# Patient Record
Sex: FEMALE | Race: WHITE
[De-identification: names, ages, dates, MRNs, and addresses within clinical notes are randomized per-mention and may not be internally consistent; named-entity substitution may affect disease eponyms.]

---

## 2019-03-13 ENCOUNTER — HOSPITAL ENCOUNTER (EMERGENCY)
Dept: HOSPITAL 62 - ER | Age: 29
Discharge: HOME | End: 2019-03-13
Payer: COMMERCIAL

## 2019-03-13 VITALS — SYSTOLIC BLOOD PRESSURE: 143 MMHG | DIASTOLIC BLOOD PRESSURE: 91 MMHG

## 2019-03-13 DIAGNOSIS — F17.200: ICD-10-CM

## 2019-03-13 DIAGNOSIS — Z87.81: ICD-10-CM

## 2019-03-13 DIAGNOSIS — M25.552: ICD-10-CM

## 2019-03-13 DIAGNOSIS — M25.512: Primary | ICD-10-CM
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
## 2019-03-13 DIAGNOSIS — M25.562: ICD-10-CM

## 2019-03-13 DIAGNOSIS — R10.30: ICD-10-CM

## 2019-03-13 DIAGNOSIS — R51: ICD-10-CM

## 2019-03-13 PROCEDURE — 99283 EMERGENCY DEPT VISIT LOW MDM: CPT
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
## 2019-03-23 ENCOUNTER — HOSPITAL ENCOUNTER (EMERGENCY)
Dept: HOSPITAL 62 - ER | Age: 29
Discharge: HOME | End: 2019-03-23
Payer: MEDICAID

## 2019-03-23 VITALS — DIASTOLIC BLOOD PRESSURE: 74 MMHG | SYSTOLIC BLOOD PRESSURE: 138 MMHG
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
## 2019-03-23 DIAGNOSIS — S00.83XA: Primary | ICD-10-CM

## 2019-03-23 PROCEDURE — L0120 CERV FLEX N/ADJ FOAM PRE OTS: HCPCS

## 2019-03-23 PROCEDURE — 99283 EMERGENCY DEPT VISIT LOW MDM: CPT
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
## 2020-10-23 ENCOUNTER — HOSPITAL ENCOUNTER (EMERGENCY)
Dept: HOSPITAL 62 - ER | Age: 30
Discharge: ANOTHER HOSPITAL | End: 2020-10-23
Payer: COMMERCIAL

## 2020-10-23 VITALS — SYSTOLIC BLOOD PRESSURE: 110 MMHG | DIASTOLIC BLOOD PRESSURE: 60 MMHG

## 2020-10-23 DIAGNOSIS — S83.105A: Primary | ICD-10-CM
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
## 2020-10-23 DIAGNOSIS — M79.605: ICD-10-CM

## 2020-10-23 DIAGNOSIS — M25.562: ICD-10-CM

## 2020-10-23 DIAGNOSIS — M25.552: ICD-10-CM
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
## 2020-10-23 PROCEDURE — 96374 THER/PROPH/DIAG INJ IV PUSH: CPT

## 2020-10-23 PROCEDURE — 99284 EMERGENCY DEPT VISIT MOD MDM: CPT

## 2020-10-23 PROCEDURE — 73552 X-RAY EXAM OF FEMUR 2/>: CPT

## 2020-10-23 PROCEDURE — 96376 TX/PRO/DX INJ SAME DRUG ADON: CPT

## 2020-10-23 PROCEDURE — 96375 TX/PRO/DX INJ NEW DRUG ADDON: CPT

## 2020-10-23 NOTE — RADIOLOGY REPORT (SQ)
CLINICAL INDICATION: unable to bear weight. .



TECHNIQUE: NUMBER view(s) were obtained of the SIDE femur. 



COMPARISON: None.



FINDINGS:

No acute displaced fracture is identified of the femur. 

Subluxation medially and posteriorly of the femoral condyles with

relation to the tibia.  This is medial subluxation.  Surrounding

soft tissues are unremarkable.



If hip or knee are clinically in suspicion, then dedicated

radiography is advised.





IMPRESSION: 

No evidence of acute displaced fracture of the femur.  Apparent

medial and posterior subluxation of the knee. Dedicated knee

imaging is advised

## 2020-10-23 NOTE — ER DOCUMENT REPORT
Entered by JOVI CONTE SCRIBE  10/23/20 1904 





Acting as scribe for:JOE SHULTZ, 





ED Extremity Problem, Lower





- General


Stated Complaint: LEFT KNEE PAIN


Time Seen by Provider: 10/23/20 18:58


Information source: Patient, [facility] Records


Notes: 





This 30 year old female patient presents to the emergency department today with 

left lower extremity pain. Per nurse, patient arrived by EMS after slipping on a

trampoline. Patient reports pain to her left hip down to her knee. Patient 

states her last menstrual period was x2 weeks ago. 


TRAVEL OUTSIDE OF THE [place] IN LAST 30 DAYS: No





- Related Data


Allergies/Adverse Reactions: 


                                        





No Known Allergies Allergy (Unverified 19 10:25)


   











Past Medical History





- General


Information source: Patient, [facility] Records





- Social History


Smoking Status: Unknown if Ever Smoked


Family History: Reviewed & Not Pertinent


Renal/ Medical History: Denies: Hx Peritoneal Dialysis


Traumatic Medical History: Reports: Hx Fractures - clavicle


Past Surgical History: Reports: Hx  Section





Review of Systems





- Review of Systems


Constitutional: No symptoms reported


EENT: No symptoms reported


Cardiovascular: No symptoms reported


Respiratory: No symptoms reported


Gastrointestinal: No symptoms reported


Genitourinary: No symptoms reported


Female Genitourinary: See HPI, Last menstrual period - x2 weeks ago


Musculoskeletal: See HPI, Other - LLE pain


Skin: No symptoms reported


Hematologic/Lymphatic: No symptoms reported


Neurological/Psychological: No symptoms reported


-: Yes All other systems reviewed and negative





Physical Exam





- Vital signs


Vitals: 


                                        











Temp


 


 98.7 F 


 


 10/23/20 18:18














- General


Notes: 


Alert. Slightly anxious. 





- HEENT


Head: Normocephalic, Atraumatic


Eyes: Normal


Pupils: PERRL





- Respiratory


Respiratory status: No respiratory distress


Chest status: Nontender


Breath sounds: Normal


Chest palpation: Normal





- Cardiovascular


Rhythm: Regular


Heart sounds: Normal auscultation


Murmur: No





- Abdominal


Inspection: Obese


Distension: No distension


Bowel sounds: Normal


Tenderness: Nontender





- Extremities


General upper extremity: Normal inspection, Normal ROM


Notes: 


Left knee is held flexed approximately at 90 degrees. 


Left hip is externally rotated. True hip pain. 


Dorsalis pedis pulse is palpable of LLE.  


Unable to extend left lower extremity due to pain.





- Neurological


Neuro grossly intact: Yes


Cognition: Normal


Orientation: AAOx4


[place] Coma Scale Eye Opening: Spontaneous


[place] Coma Scale Verbal: Oriented


[place] Coma Scale Motor: Obeys Commands


[place] Coma Scale Total: 15


Speech: Normal


Sensory: Normal





- Psychological


Associated symptoms: Normal affect, Normal mood, Anxious





- Skin


Skin Temperature: Warm


Skin Moisture: Dry


Skin Color: Normal





Course





- Re-evaluation


Re-evalutation: 





10/23/20 20:24


MDM  30 year old female with injury jumping on trampoline a short time prior to 

arrival.  She has a posterior dislocation of the tibia on the femur with warm 

right foot/ ankle and dp pulse palpable when I see her.  I see no displaced fx 

on the films here.  I have discussed with Dr. Brian who directed me to call 

[facility] due to risk of vascular injury and after speaking with orthopedics there - 

Dr. Lino - he reccomends a trauma evaluation and ED to ED transfer. 


10/23/20 21:01


Dr. SOM Larson at [facility] has graciously accepted the pt in transfer to the ED at 

[place].  We have requested help with transfer and logistics are being arranged.  She

still has moderate pain in that left knee and another mg of dilaudid has been 

ordered. 











- Vital Signs


Vital signs: 


                                        











Temp Pulse Resp BP Pulse Ox


 


 100.4 F      17   110/60   97 


 


 10/23/20 21:03     10/23/20 21:03  10/23/20 21:03  10/23/20 21:03














Critical Care Note





- Critical Care Note


Total time excluding time spent on procedures (mins): 30





Discharge





- Discharge


Clinical Impression: 


Left knee dislocation


Qualifiers:


 Encounter type: initial encounter Qualified Code(s): S83.105A - Unspecified 

dislocation of left knee, initial encounter





Condition: Serious


Disposition: [facility]





I personally performed the services described in the documentation, reviewed and

edited the documentation which was dictated to the scribe in my presence, and it

accurately records my words and actions.